# Patient Record
Sex: MALE | Race: WHITE | NOT HISPANIC OR LATINO | Employment: FULL TIME | URBAN - METROPOLITAN AREA
[De-identification: names, ages, dates, MRNs, and addresses within clinical notes are randomized per-mention and may not be internally consistent; named-entity substitution may affect disease eponyms.]

---

## 2017-04-05 ENCOUNTER — ALLSCRIPTS OFFICE VISIT (OUTPATIENT)
Dept: OTHER | Facility: OTHER | Age: 32
End: 2017-04-05

## 2017-07-17 ENCOUNTER — GENERIC CONVERSION - ENCOUNTER (OUTPATIENT)
Dept: OTHER | Facility: OTHER | Age: 32
End: 2017-07-17

## 2017-08-14 ENCOUNTER — ALLSCRIPTS OFFICE VISIT (OUTPATIENT)
Dept: OTHER | Facility: OTHER | Age: 32
End: 2017-08-14

## 2017-11-17 ENCOUNTER — ALLSCRIPTS OFFICE VISIT (OUTPATIENT)
Dept: OTHER | Facility: OTHER | Age: 32
End: 2017-11-17

## 2017-11-18 NOTE — PROGRESS NOTES
Assessment    1  Macular erythematous rash (782 1) (L53 8)    Plan  Acute URI    · Cefuroxime Axetil 500 MG Oral Tablet  Health Maintenance    · Influenza  Macular erythematous rash    · Triamcinolone Acetonide 0 5 % External Cream; APPLY SPARINGLY TOAFFECTED AREA(S) 2 TO 3 TIMES DAILY  Tinea corporis    · Ciclopirox 0 77 % External Gel    Chief Complaint    1  Rash  Patient is in the office today with concerns of a rash that keeps coming back on his left arm  History of Present Illness  HPI: Patient comes in with persistent rash medial aspect of his left biceps  This has not responded to antifungal creams  Review of Systems   Constitutional: no fever or chills, feels well, no tiredness, no recent weight loss or weight gain  Respiratory: no complaints of shortness of breath, no wheezing or cough, no dyspnea on exertion, no orthopnea or PND  Gastrointestinal: no complaints of abdominal pain, no constipation, no nausea or vomiting, no diarrhea or bloody stools  Active Problems  1  Acute URI (465 9) (J06 9)   2  Allergic rhinitis (477 9) (J30 9)   3  Asthma with acute exacerbation, unspecified asthma severity   4  Bronchitis, acute (466 0) (J20 9)   5  Gastroenteritis (558 9) (K52 9)   6  Tinea corporis (110 5) (B35 4)    Past Medical History  Active Problems And Past Medical History Reviewed: The active problems and past medical history were reviewed and updated today  Family History  Mother    1  Family history of mental disorder (V17 0) (Z81 8)   2  Family history of Hypertension (401 9) (I10)  Maternal Grandmother    3  Family history of Cancer (199 1) (C80 1)  Maternal Great Grandfather    4  Family history of Cancer (199 1) (C80 1)  Aunt    5  Family history of Cancer (199 1) (C80 1)  Family History    6   Denied: Family history of substance abuse    Social History     · Denied: History of Alcohol use   · Always uses seat belt   · Denied: History of Drug use   · Employed   · Never a smoker   · Never smoked   · Seeing a dentist   · Single    Current Meds   1  Cefuroxime Axetil 500 MG Oral Tablet; 1 bid with food; Therapy: 48CDI5856 to (Last Rx:01Myy1359)  Requested for: 14Aug2017 Ordered   2  Ciclopirox 0 77 % External Gel; APPLY AND GENTLY MASSAGE INTO AFFECTED AREA(S) TWICE DAILY; Therapy: 44WCX3241 to (Last Rx:14Aug2017)  Requested for: 19Nwm5006 Ordered    The medication list was reviewed and updated today  Allergies  1  Penicillins   2  doxycycline    Vitals   Recorded: 48ABP6546 10:01AM   Temperature 98 7 F   Heart Rate 66   Systolic 125   Diastolic 72   Height 5 ft 8 in   Weight 169 lb 8 oz   BMI Calculated 25 77   BSA Calculated 1 91   O2 Saturation 95       Physical Exam   Skin  Examination of the skin for lesions: Abnormal  -- Circular red rash with central clearing          Signatures   Electronically signed by : MAY Moses ; Nov 17 2017 10:37AM EST                       (Author)

## 2018-01-08 ENCOUNTER — ALLSCRIPTS OFFICE VISIT (OUTPATIENT)
Dept: OTHER | Facility: OTHER | Age: 33
End: 2018-01-08

## 2018-01-11 NOTE — MISCELLANEOUS
Message  Return to work or school:   Mio Andrew is under my professional care   He was seen in my office on 07/17/2017   He is able to return to work on  07/18/2017       Radha Galvez MD       Signatures   Electronically signed by : MAY Burton ; Jul 17 2017  1:50PM EST

## 2018-01-12 VITALS
HEIGHT: 68 IN | BODY MASS INDEX: 24.86 KG/M2 | TEMPERATURE: 97.4 F | HEART RATE: 68 BPM | OXYGEN SATURATION: 98 % | DIASTOLIC BLOOD PRESSURE: 66 MMHG | WEIGHT: 164 LBS | SYSTOLIC BLOOD PRESSURE: 112 MMHG

## 2018-01-12 VITALS
HEART RATE: 79 BPM | OXYGEN SATURATION: 98 % | WEIGHT: 164 LBS | TEMPERATURE: 100.9 F | DIASTOLIC BLOOD PRESSURE: 70 MMHG | SYSTOLIC BLOOD PRESSURE: 120 MMHG | HEIGHT: 68 IN | BODY MASS INDEX: 24.86 KG/M2

## 2018-01-12 VITALS
SYSTOLIC BLOOD PRESSURE: 118 MMHG | DIASTOLIC BLOOD PRESSURE: 72 MMHG | WEIGHT: 169.5 LBS | HEIGHT: 68 IN | OXYGEN SATURATION: 95 % | HEART RATE: 66 BPM | BODY MASS INDEX: 25.69 KG/M2 | TEMPERATURE: 98.7 F

## 2018-01-23 VITALS
SYSTOLIC BLOOD PRESSURE: 140 MMHG | OXYGEN SATURATION: 96 % | DIASTOLIC BLOOD PRESSURE: 84 MMHG | HEART RATE: 73 BPM | TEMPERATURE: 99.5 F | BODY MASS INDEX: 26.07 KG/M2 | HEIGHT: 68 IN | WEIGHT: 172 LBS

## 2018-01-23 NOTE — MISCELLANEOUS
Message  Return to work or school:   Britney Rogers is under my professional care  He was seen in my office on 1/8/2018   He is able to return to work on   01/10/2018     Please make allowances to ensure Al is not exposed to respiratory irritants while at work including chemicals  Thank you  JUSTYNA Sandoval        Signatures   Electronically signed by : Kristine Huerta; Jan 8 2018  3:26PM EST                       (Author)    Electronically signed by : Kristine Huerta; Jan 8 2018  3:55PM EST                       (Author)

## 2018-03-28 ENCOUNTER — DOCUMENTATION (OUTPATIENT)
Dept: FAMILY MEDICINE CLINIC | Facility: CLINIC | Age: 33
End: 2018-03-28

## 2018-03-28 ENCOUNTER — OFFICE VISIT (OUTPATIENT)
Dept: FAMILY MEDICINE CLINIC | Facility: CLINIC | Age: 33
End: 2018-03-28
Payer: COMMERCIAL

## 2018-03-28 VITALS
WEIGHT: 177 LBS | RESPIRATION RATE: 16 BRPM | TEMPERATURE: 98.8 F | DIASTOLIC BLOOD PRESSURE: 82 MMHG | SYSTOLIC BLOOD PRESSURE: 122 MMHG | HEART RATE: 70 BPM | BODY MASS INDEX: 26.83 KG/M2 | HEIGHT: 68 IN | OXYGEN SATURATION: 97 %

## 2018-03-28 DIAGNOSIS — J06.9 VIRAL UPPER RESPIRATORY INFECTION: Primary | ICD-10-CM

## 2018-03-28 DIAGNOSIS — J45.20 MILD INTERMITTENT ASTHMA WITHOUT COMPLICATION: ICD-10-CM

## 2018-03-28 PROBLEM — G43.909 MIGRAINE, UNSPECIFIED, NOT INTRACTABLE, WITHOUT STATUS MIGRAINOSUS: Status: ACTIVE | Noted: 2018-01-08

## 2018-03-28 PROCEDURE — 99214 OFFICE O/P EST MOD 30 MIN: CPT | Performed by: NURSE PRACTITIONER

## 2018-03-28 RX ORDER — BUTALBITAL, ACETAMINOPHEN AND CAFFEINE 50; 325; 40 MG/1; MG/1; MG/1
2 TABLET ORAL EVERY 4 HOURS PRN
COMMUNITY
Start: 2018-01-08

## 2018-03-28 RX ORDER — TRIAMCINOLONE ACETONIDE 5 MG/G
CREAM TOPICAL
COMMUNITY
Start: 2017-11-17 | End: 2018-03-28 | Stop reason: ALTCHOICE

## 2018-03-28 RX ORDER — BROMPHENIRAMINE MALEATE, PSEUDOEPHEDRINE HYDROCHLORIDE, AND DEXTROMETHORPHAN HYDROBROMIDE 2; 30; 10 MG/5ML; MG/5ML; MG/5ML
5 SYRUP ORAL 4 TIMES DAILY PRN
Qty: 120 ML | Refills: 0 | Status: SHIPPED | OUTPATIENT
Start: 2018-03-28

## 2018-03-28 NOTE — PROGRESS NOTES
Assessment/Plan:    Viral upper respiratory infection  To begin Bromfed DM as needed for cough and congestion  Counseled on increasing fluid in respiratory irritants in using a cool mist humidifier bedtime  Follow-up is symptoms do not begin to improve by early next week  Mild intermittent asthma without complication    Continue to use ProAir as needed  for shortness of breath/wheezing  Diagnoses and all orders for this visit:    Viral upper respiratory infection  -     brompheniramine-pseudoephedrine-DM 30-2-10 MG/5ML syrup; Take 5 mL by mouth 4 (four) times a day as needed for congestion or cough    Mild intermittent asthma without complication    Other orders  -     PROAIR  (90 Base) MCG/ACT inhaler; Inhale 2 puffs  -     butalbital-acetaminophen-caffeine (FIORICET,ESGIC) -40 mg per tablet; Take 2 tablets by mouth every 4 (four) hours as needed  -     Discontinue: triamcinolone (KENALOG) 0 5 % cream; Apply topically          Subjective:      Patient ID: Bobbe Hatchet is a 27 y o  male  Benjaminangela Parrishshekhar presents reporting nasal congestion x 2 days  He is also having a sore throat and a dry cough  Denies shortness of breath, wheezing, fever, or difficulty swallowing  He treated his symptoms with Mucinex, his rescue inhaler, and ibuprofen  The Mucinex and inhaler helped provide some relief of his symptoms  His last dose of ibuprofen was yesterday  (+) asthma       Sore Throat    Associated symptoms include congestion, coughing and shortness of breath  The following portions of the patient's history were reviewed and updated as appropriate: He  has no past medical history on file    He   Patient Active Problem List    Diagnosis Date Noted    Viral upper respiratory infection 03/28/2018    Mild intermittent asthma without complication 20/66/3917    Migraine, unspecified, not intractable, without status migrainosus 01/08/2018    Allergic rhinitis 10/07/2014     He  has no past surgical history on file  His family history is not on file  He  reports that he has never smoked  He has never used smokeless tobacco  He reports that he drinks alcohol  He reports that he does not use drugs  Current Outpatient Prescriptions   Medication Sig Dispense Refill    PROAIR  (90 Base) MCG/ACT inhaler Inhale 2 puffs  1    brompheniramine-pseudoephedrine-DM 30-2-10 MG/5ML syrup Take 5 mL by mouth 4 (four) times a day as needed for congestion or cough 120 mL 0    butalbital-acetaminophen-caffeine (FIORICET,ESGIC) -40 mg per tablet Take 2 tablets by mouth every 4 (four) hours as needed       No current facility-administered medications for this visit  No current outpatient prescriptions on file prior to visit  No current facility-administered medications on file prior to visit  He is allergic to doxycycline and penicillins       Review of Systems   Constitutional: Positive for fever  HENT: Positive for congestion, sinus pressure and sore throat  Eyes: Negative  Respiratory: Positive for cough and shortness of breath  Negative for chest tightness and wheezing  Gastrointestinal: Negative  Neurological: Negative  Psychiatric/Behavioral: Negative  /82   Pulse 70   Temp 98 8 °F (37 1 °C)   Resp 16   Ht 5' 8" (1 727 m)   Wt 80 3 kg (177 lb)   SpO2 97%   BMI 26 91 kg/m²     Objective:     Physical Exam   Constitutional: He is oriented to person, place, and time  He appears well-developed and well-nourished  HENT:   Head: Normocephalic and atraumatic  Right Ear: Tympanic membrane and external ear normal    Left Ear: Tympanic membrane and external ear normal    Nose: Mucosal edema and rhinorrhea present  Right sinus exhibits no maxillary sinus tenderness and no frontal sinus tenderness  Left sinus exhibits no maxillary sinus tenderness and no frontal sinus tenderness     Mouth/Throat: Oropharynx is clear and moist and mucous membranes are normal    Neck: Neck supple  Cardiovascular: Normal rate and regular rhythm  Pulmonary/Chest: Effort normal and breath sounds normal  No respiratory distress  He has no wheezes  He has no rales  He exhibits no tenderness  Lymphadenopathy:     He has no cervical adenopathy  Neurological: He is alert and oriented to person, place, and time  Skin: Skin is warm and dry  Psychiatric: He has a normal mood and affect   His behavior is normal  Judgment and thought content normal

## 2018-03-28 NOTE — ASSESSMENT & PLAN NOTE
To begin Bromfed DM as needed for cough and congestion  Counseled on increasing fluid in respiratory irritants in using a cool mist humidifier bedtime  Follow-up is symptoms do not begin to improve by early next week

## 2018-05-16 ENCOUNTER — TELEPHONE (OUTPATIENT)
Dept: FAMILY MEDICINE CLINIC | Facility: CLINIC | Age: 33
End: 2018-05-16

## 2018-05-16 NOTE — TELEPHONE ENCOUNTER
Pt has sinus pressure, congestion and cough and would like an abx send  To the pharmacy for him  He is not able to come in for an appt  Please advise

## 2018-05-16 NOTE — TELEPHONE ENCOUNTER
I am not comfortable sending in an antibiotic unless the patient is seen  He may try over-the-counter cold medication, but if he feeling that poorly it would be good to make an appointment, thank you

## 2018-05-17 ENCOUNTER — OFFICE VISIT (OUTPATIENT)
Dept: FAMILY MEDICINE CLINIC | Facility: CLINIC | Age: 33
End: 2018-05-17
Payer: COMMERCIAL

## 2018-05-17 VITALS
WEIGHT: 166 LBS | SYSTOLIC BLOOD PRESSURE: 126 MMHG | HEART RATE: 68 BPM | TEMPERATURE: 99.5 F | HEIGHT: 68 IN | BODY MASS INDEX: 25.16 KG/M2 | OXYGEN SATURATION: 98 % | RESPIRATION RATE: 18 BRPM | DIASTOLIC BLOOD PRESSURE: 74 MMHG

## 2018-05-17 DIAGNOSIS — J02.9 PHARYNGITIS, UNSPECIFIED ETIOLOGY: Primary | ICD-10-CM

## 2018-05-17 PROBLEM — J06.9 VIRAL UPPER RESPIRATORY INFECTION: Status: RESOLVED | Noted: 2018-03-28 | Resolved: 2018-05-17

## 2018-05-17 LAB — S PYO AG THROAT QL: NEGATIVE

## 2018-05-17 PROCEDURE — 99213 OFFICE O/P EST LOW 20 MIN: CPT | Performed by: NURSE PRACTITIONER

## 2018-05-17 PROCEDURE — 87880 STREP A ASSAY W/OPTIC: CPT | Performed by: NURSE PRACTITIONER

## 2018-05-17 PROCEDURE — 1036F TOBACCO NON-USER: CPT | Performed by: NURSE PRACTITIONER

## 2018-05-17 PROCEDURE — 3008F BODY MASS INDEX DOCD: CPT | Performed by: NURSE PRACTITIONER

## 2018-05-17 PROCEDURE — 87070 CULTURE OTHR SPECIMN AEROBIC: CPT | Performed by: NURSE PRACTITIONER

## 2018-05-17 RX ORDER — METHOCARBAMOL 500 MG/1
500 TABLET, FILM COATED ORAL EVERY 4 HOURS PRN
Refills: 0 | COMMUNITY
Start: 2018-04-11 | End: 2018-05-17 | Stop reason: ALTCHOICE

## 2018-05-17 RX ORDER — OXYCODONE HYDROCHLORIDE AND ACETAMINOPHEN 5; 325 MG/1; MG/1
1 TABLET ORAL EVERY 6 HOURS PRN
Refills: 0 | COMMUNITY
Start: 2018-04-09 | End: 2018-05-17 | Stop reason: ALTCHOICE

## 2018-05-17 RX ORDER — IBUPROFEN 800 MG/1
TABLET ORAL
Refills: 0 | COMMUNITY
Start: 2018-04-10

## 2018-05-17 NOTE — ASSESSMENT & PLAN NOTE
Rapid strep negative, will send out throat culture and follow  To begin viscous lidocaine as needed for relief  Counseled on increasing fluid intake and gargling with salt water  Follow-up as needed

## 2018-05-17 NOTE — PROGRESS NOTES
Assessment/Plan:    Pharyngitis  Rapid strep negative, will send out throat culture and follow  To begin viscous lidocaine as needed for relief  Counseled on increasing fluid intake and gargling with salt water  Follow-up as needed  Diagnoses and all orders for this visit:    Pharyngitis, unspecified etiology  -     POCT rapid strepA  -     Throat culture  -     lidocaine viscous (XYLOCAINE) 2 % mucosal solution; Swish and spit 15 mL 4 (four) times a day as needed for mild pain    Other orders  -     ibuprofen (MOTRIN) 800 mg tablet; TAKE 1 TABLET BY MOUTH 3 TIMES A DAY TAKE WITH FOOD MAX 3 TABS A DAY  -     Discontinue: methocarbamol (ROBAXIN) 500 mg tablet; Take 500 mg by mouth every 4 (four) hours as needed  -     Discontinue: oxyCODONE-acetaminophen (PERCOCET) 5-325 mg per tablet; Take 1 tablet by mouth every 6 (six) hours as needed          Subjective:      Patient ID: Snehal Peña is a 27 y o  male  Sore Throat    This is a new problem  Episode onset: three days ago  The problem has been unchanged  There has been no fever  Associated symptoms include congestion, coughing, headaches and swollen glands  He has had no exposure to strep  He has tried NSAIDs for the symptoms  The treatment provided mild relief  The following portions of the patient's history were reviewed and updated as appropriate: allergies, current medications, past family history, past medical history, past social history, past surgical history and problem list     Review of Systems   Constitutional: Positive for fatigue  HENT: Positive for congestion, mouth sores and sore throat  Respiratory: Positive for cough  Cardiovascular: Negative  Neurological: Positive for headaches  Psychiatric/Behavioral: Negative            /74   Pulse 68   Temp 99 5 °F (37 5 °C)   Resp 18   Ht 5' 8" (1 727 m)   Wt 75 3 kg (166 lb)   SpO2 98%   BMI 25 24 kg/m²     Objective:     Physical Exam Constitutional: He is oriented to person, place, and time  He appears well-developed and well-nourished  HENT:   Head: Normocephalic and atraumatic  Right Ear: External ear normal    Left Ear: External ear normal    Nose: Nose normal    Mouth/Throat: Posterior oropharyngeal erythema present  No oropharyngeal exudate  Neck: Neck supple  Cardiovascular: Normal rate, regular rhythm and normal heart sounds  No murmur heard  Pulmonary/Chest: Effort normal and breath sounds normal  No respiratory distress  He has no wheezes  He has no rales  He exhibits no tenderness  Lymphadenopathy:     He has no cervical adenopathy  Neurological: He is oriented to person, place, and time  Skin: Skin is warm and dry  Psychiatric: He has a normal mood and affect   His behavior is normal  Judgment and thought content normal        Results for orders placed or performed in visit on 05/17/18   POCT rapid strepA   Result Value Ref Range     RAPID STREP A Negative Negative

## 2018-05-18 ENCOUNTER — TELEPHONE (OUTPATIENT)
Dept: FAMILY MEDICINE CLINIC | Facility: CLINIC | Age: 33
End: 2018-05-18

## 2018-05-18 DIAGNOSIS — J06.9 URI, ACUTE: Primary | ICD-10-CM

## 2018-05-18 RX ORDER — CEFUROXIME AXETIL 500 MG/1
500 TABLET ORAL 2 TIMES DAILY
Qty: 20 TABLET | Refills: 0 | Status: SHIPPED | OUTPATIENT
Start: 2018-05-18 | End: 2018-05-28

## 2018-05-18 NOTE — TELEPHONE ENCOUNTER
Pt was seen yesterday by Andres Suazo  Last night he started to feel worse  He vomites, has 101 0 fever and sinus pressure  He would like an abx  He does not want to wait until Monday for Andres Suazo  Please advise

## 2018-05-19 LAB — BACTERIA THROAT CULT: NORMAL

## 2018-10-23 ENCOUNTER — TELEPHONE (OUTPATIENT)
Dept: FAMILY MEDICINE CLINIC | Facility: CLINIC | Age: 33
End: 2018-10-23

## 2018-10-24 NOTE — TELEPHONE ENCOUNTER
Patient has not been seen since may, He would need to be seen before Claudio Foster will write a note

## 2018-10-24 NOTE — TELEPHONE ENCOUNTER
What is the note for? I last saw him in May  If it was due to being sick, I did not see him I am not comfortable providing this

## 2019-01-14 ENCOUNTER — APPOINTMENT (OUTPATIENT)
Dept: URGENT CARE | Facility: MEDICAL CENTER | Age: 34
End: 2019-01-14
Payer: OTHER MISCELLANEOUS

## 2019-01-14 PROCEDURE — G0382 LEV 3 HOSP TYPE B ED VISIT: HCPCS | Performed by: PHYSICIAN ASSISTANT

## 2019-01-14 PROCEDURE — 99283 EMERGENCY DEPT VISIT LOW MDM: CPT | Performed by: PHYSICIAN ASSISTANT

## 2019-01-16 ENCOUNTER — APPOINTMENT (OUTPATIENT)
Dept: URGENT CARE | Facility: MEDICAL CENTER | Age: 34
End: 2019-01-16
Payer: OTHER MISCELLANEOUS

## 2019-01-16 PROCEDURE — 99213 OFFICE O/P EST LOW 20 MIN: CPT | Performed by: NURSE PRACTITIONER

## 2019-01-23 ENCOUNTER — APPOINTMENT (OUTPATIENT)
Dept: URGENT CARE | Facility: MEDICAL CENTER | Age: 34
End: 2019-01-23
Payer: OTHER MISCELLANEOUS

## 2019-01-23 PROCEDURE — 99213 OFFICE O/P EST LOW 20 MIN: CPT

## 2019-06-20 ENCOUNTER — APPOINTMENT (OUTPATIENT)
Dept: URGENT CARE | Facility: MEDICAL CENTER | Age: 34
End: 2019-06-20
Payer: OTHER MISCELLANEOUS

## 2019-06-20 PROCEDURE — 99283 EMERGENCY DEPT VISIT LOW MDM: CPT | Performed by: PHYSICIAN ASSISTANT

## 2019-06-20 PROCEDURE — G0382 LEV 3 HOSP TYPE B ED VISIT: HCPCS | Performed by: PHYSICIAN ASSISTANT

## 2019-06-25 ENCOUNTER — APPOINTMENT (OUTPATIENT)
Dept: OCCUPATIONAL MEDICINE | Facility: CLINIC | Age: 34
End: 2019-06-25
Payer: OTHER MISCELLANEOUS

## 2019-06-25 PROCEDURE — 99213 OFFICE O/P EST LOW 20 MIN: CPT

## 2019-07-09 ENCOUNTER — APPOINTMENT (OUTPATIENT)
Dept: OCCUPATIONAL MEDICINE | Facility: CLINIC | Age: 34
End: 2019-07-09
Payer: OTHER MISCELLANEOUS

## 2019-07-09 PROCEDURE — 99213 OFFICE O/P EST LOW 20 MIN: CPT

## 2019-07-16 ENCOUNTER — APPOINTMENT (OUTPATIENT)
Dept: OCCUPATIONAL MEDICINE | Facility: CLINIC | Age: 34
End: 2019-07-16
Payer: OTHER MISCELLANEOUS

## 2019-07-16 PROCEDURE — 99213 OFFICE O/P EST LOW 20 MIN: CPT

## 2019-08-05 ENCOUNTER — APPOINTMENT (OUTPATIENT)
Dept: OCCUPATIONAL MEDICINE | Facility: CLINIC | Age: 34
End: 2019-08-05
Payer: OTHER MISCELLANEOUS

## 2019-08-05 PROCEDURE — 99213 OFFICE O/P EST LOW 20 MIN: CPT

## 2019-08-14 ENCOUNTER — OFFICE VISIT (OUTPATIENT)
Dept: FAMILY MEDICINE CLINIC | Facility: CLINIC | Age: 34
End: 2019-08-14
Payer: COMMERCIAL

## 2019-08-14 VITALS
WEIGHT: 165 LBS | SYSTOLIC BLOOD PRESSURE: 126 MMHG | HEART RATE: 81 BPM | TEMPERATURE: 97.5 F | RESPIRATION RATE: 16 BRPM | DIASTOLIC BLOOD PRESSURE: 80 MMHG | OXYGEN SATURATION: 98 % | BODY MASS INDEX: 26.52 KG/M2 | HEIGHT: 66 IN

## 2019-08-14 DIAGNOSIS — B34.9 VIRAL SYNDROME: Primary | ICD-10-CM

## 2019-08-14 PROCEDURE — 1036F TOBACCO NON-USER: CPT | Performed by: FAMILY MEDICINE

## 2019-08-14 PROCEDURE — 99213 OFFICE O/P EST LOW 20 MIN: CPT | Performed by: FAMILY MEDICINE

## 2019-08-14 PROCEDURE — 3008F BODY MASS INDEX DOCD: CPT | Performed by: FAMILY MEDICINE

## 2019-08-14 NOTE — PROGRESS NOTES
Assessment/Plan:       Diagnoses and all orders for this visit:    Viral syndrome        Viral syndrome  Symptomatic treatment        Subjective:      Patient ID: Regina Ferrell is a 26 y o  male  24 hour history of fever, chills, achiness and nausea      The following portions of the patient's history were reviewed and updated as appropriate:   He has no past medical history on file  ,  does not have any pertinent problems on file  ,   has no past surgical history on file  ,  family history is not on file  ,   reports that he has never smoked  He has never used smokeless tobacco  He reports that he drinks alcohol  He reports that he does not use drugs  ,  is allergic to doxycycline and penicillins     Current Outpatient Medications   Medication Sig Dispense Refill    brompheniramine-pseudoephedrine-DM 30-2-10 MG/5ML syrup Take 5 mL by mouth 4 (four) times a day as needed for congestion or cough 120 mL 0    butalbital-acetaminophen-caffeine (FIORICET,ESGIC) -40 mg per tablet Take 2 tablets by mouth every 4 (four) hours as needed      ibuprofen (MOTRIN) 800 mg tablet TAKE 1 TABLET BY MOUTH 3 TIMES A DAY TAKE WITH FOOD MAX 3 TABS A DAY  0    lidocaine viscous (XYLOCAINE) 2 % mucosal solution Swish and spit 15 mL 4 (four) times a day as needed for mild pain 50 mL 0    PROAIR  (90 Base) MCG/ACT inhaler Inhale 2 puffs  1     No current facility-administered medications for this visit  Review of Systems   Constitutional: Positive for chills and fever  HENT: Positive for congestion  Gastrointestinal: Positive for nausea  Musculoskeletal: Positive for arthralgias and myalgias  Objective:  Vitals:    08/14/19 1119   BP: 126/80   Pulse: 81   Resp: 16   Temp: 97 5 °F (36 4 °C)   SpO2: 98%   Weight: 74 8 kg (165 lb)   Height: 5' 6" (1 676 m)     Body mass index is 26 63 kg/m²  Physical Exam   Constitutional: He is oriented to person, place, and time   He appears well-developed and well-nourished  HENT:   Head: Normocephalic and atraumatic  Right Ear: Tympanic membrane and external ear normal    Left Ear: Tympanic membrane and external ear normal    Mouth/Throat: Oropharynx is clear and moist    Eyes: Pupils are equal, round, and reactive to light  EOM are normal    Neck: Neck supple  Cardiovascular: Normal rate, regular rhythm and normal heart sounds  Pulmonary/Chest: Effort normal and breath sounds normal    Abdominal: Soft  Bowel sounds are normal    Musculoskeletal: Normal range of motion  Neurological: He is alert and oriented to person, place, and time  Skin: Skin is warm and dry  Psychiatric: He has a normal mood and affect  Thought content normal      BMI Counseling: Body mass index is 26 63 kg/m²  Discussed the patient's BMI with him  The BMI is above average  BMI counseling and education was provided to the patient  Nutrition recommendations include decreasing overall calorie intake

## 2019-08-15 ENCOUNTER — TELEPHONE (OUTPATIENT)
Dept: FAMILY MEDICINE CLINIC | Facility: CLINIC | Age: 34
End: 2019-08-15

## 2019-08-15 NOTE — TELEPHONE ENCOUNTER
I would recommend another 24-48 hours, Dr Malik Gonzales noted this is viral and the z-pack will not help with that

## 2019-08-15 NOTE — TELEPHONE ENCOUNTER
This is Dr Adolfo Ordonez pt  Pt mother called and pt is still not better and wants to know if you can send in a zpack for him  [phone]

## 2019-08-16 ENCOUNTER — TELEPHONE (OUTPATIENT)
Dept: FAMILY MEDICINE CLINIC | Facility: CLINIC | Age: 34
End: 2019-08-16

## 2019-08-16 NOTE — TELEPHONE ENCOUNTER
pts mother called and he still has a fever of 101- saw BD on 8/14/19- Mother thinks he needs antibiotics -, can you assist? TY   [facility]   Call back

## 2019-08-16 NOTE — TELEPHONE ENCOUNTER
He did not have a fever when here  If he has a fever he it to alternate Tylenol and Ibuprofen  From reading the note form his visit there was no indication for an antibiotic

## 2019-08-30 ENCOUNTER — TELEPHONE (OUTPATIENT)
Dept: OTHER | Facility: OTHER | Age: 34
End: 2019-08-30

## 2019-11-13 ENCOUNTER — OFFICE VISIT (OUTPATIENT)
Dept: FAMILY MEDICINE CLINIC | Facility: CLINIC | Age: 34
End: 2019-11-13
Payer: COMMERCIAL

## 2019-11-13 VITALS
BODY MASS INDEX: 27.97 KG/M2 | WEIGHT: 174 LBS | HEART RATE: 70 BPM | HEIGHT: 66 IN | DIASTOLIC BLOOD PRESSURE: 80 MMHG | RESPIRATION RATE: 16 BRPM | OXYGEN SATURATION: 97 % | SYSTOLIC BLOOD PRESSURE: 124 MMHG | TEMPERATURE: 98 F

## 2019-11-13 DIAGNOSIS — J01.90 ACUTE NON-RECURRENT SINUSITIS, UNSPECIFIED LOCATION: Primary | ICD-10-CM

## 2019-11-13 PROBLEM — B34.9 VIRAL SYNDROME: Status: RESOLVED | Noted: 2019-08-14 | Resolved: 2019-11-13

## 2019-11-13 PROBLEM — J02.9 PHARYNGITIS: Status: RESOLVED | Noted: 2018-05-17 | Resolved: 2019-11-13

## 2019-11-13 PROCEDURE — 99213 OFFICE O/P EST LOW 20 MIN: CPT | Performed by: FAMILY MEDICINE

## 2019-11-13 PROCEDURE — 1036F TOBACCO NON-USER: CPT | Performed by: FAMILY MEDICINE

## 2019-11-13 RX ORDER — CEFUROXIME AXETIL 500 MG/1
500 TABLET ORAL
Qty: 20 TABLET | Refills: 0 | Status: SHIPPED | OUTPATIENT
Start: 2019-11-13 | End: 2019-11-23

## 2019-11-13 RX ORDER — FLUTICASONE PROPIONATE 50 MCG
2 SPRAY, SUSPENSION (ML) NASAL DAILY
Qty: 1 BOTTLE | Refills: 1 | Status: SHIPPED | OUTPATIENT
Start: 2019-11-13

## 2019-11-13 RX ORDER — METHYLPREDNISOLONE 4 MG/1
TABLET ORAL
Qty: 21 EACH | Refills: 0 | Status: SHIPPED | OUTPATIENT
Start: 2019-11-13

## 2019-11-13 NOTE — PROGRESS NOTES
Assessment/Plan:       Diagnoses and all orders for this visit:    Acute non-recurrent sinusitis, unspecified location  -     cefuroxime (CEFTIN) 500 mg tablet; Take 1 tablet (500 mg total) by mouth 2 (two) times daily after meals for 10 days  -     fluticasone (FLONASE) 50 mcg/act nasal spray; 2 sprays into each nostril daily  -     methylPREDNISolone 4 MG tablet therapy pack; Use as directed on package        No problem-specific Assessment & Plan notes found for this encounter  Subjective:      Patient ID: Nick Duggan is a 26 y o  male  Patient comes in with a 2 week history of sinus congestion and cough  The following portions of the patient's history were reviewed and updated as appropriate:   He has a past medical history of Allergic rhinitis  ,  does not have any pertinent problems on file  ,   has no past surgical history on file  ,  family history includes Cancer in his family, family, and maternal grandmother; Hypertension in his mother; Mental illness in his mother  ,   reports that he has never smoked  He has never used smokeless tobacco  He reports that he drinks alcohol  He reports that he does not use drugs  ,  is allergic to doxycycline and penicillins     Current Outpatient Medications   Medication Sig Dispense Refill    brompheniramine-pseudoephedrine-DM 30-2-10 MG/5ML syrup Take 5 mL by mouth 4 (four) times a day as needed for congestion or cough 120 mL 0    butalbital-acetaminophen-caffeine (FIORICET,ESGIC) -40 mg per tablet Take 2 tablets by mouth every 4 (four) hours as needed      ibuprofen (MOTRIN) 800 mg tablet TAKE 1 TABLET BY MOUTH 3 TIMES A DAY TAKE WITH FOOD MAX 3 TABS A DAY  0    PROAIR  (90 Base) MCG/ACT inhaler Inhale 2 puffs  1    cefuroxime (CEFTIN) 500 mg tablet Take 1 tablet (500 mg total) by mouth 2 (two) times daily after meals for 10 days 20 tablet 0    fluticasone (FLONASE) 50 mcg/act nasal spray 2 sprays into each nostril daily 1 Bottle 1    methylPREDNISolone 4 MG tablet therapy pack Use as directed on package 21 each 0     No current facility-administered medications for this visit  Review of Systems   Constitutional: Negative  HENT: Positive for congestion  Respiratory: Positive for cough  Gastrointestinal: Negative  Objective:  Vitals:    11/13/19 1055   BP: 124/80   Pulse: 70   Resp: 16   Temp: 98 °F (36 7 °C)   SpO2: 97%   Weight: 78 9 kg (174 lb)   Height: 5' 6" (1 676 m)     Body mass index is 28 08 kg/m²  Physical Exam   Constitutional: He is oriented to person, place, and time  He appears well-developed and well-nourished  HENT:   Head: Normocephalic and atraumatic  Right Ear: Tympanic membrane and external ear normal    Left Ear: Tympanic membrane and external ear normal    Paranasal sinus are red bilaterally  Posterior oropharynx is injected   Eyes: Pupils are equal, round, and reactive to light  EOM are normal    Neck: Neck supple  Cardiovascular: Normal rate, regular rhythm and normal heart sounds  Pulmonary/Chest: Effort normal and breath sounds normal    Abdominal: Soft  Bowel sounds are normal    Musculoskeletal: Normal range of motion  Neurological: He is alert and oriented to person, place, and time  Skin: Skin is warm and dry  Psychiatric: He has a normal mood and affect   Thought content normal

## 2019-11-13 NOTE — PATIENT INSTRUCTIONS
Cold Symptoms   AMBULATORY CARE:   Cold symptoms  include sneezing, dry throat, a stuffy nose, headache, watery eyes, and a cough  Your cough may be dry, or you may cough up mucus  You may also have muscle aches, joint pain, and tiredness  Rarely, you may have a fever  Cold symptoms occur from inflammation in your upper respiratory system caused by a virus  Most colds go away without treatment  Seek care immediately if:   · You have increased tiredness and weakness  · You are unable to eat  · Your heart is beating much faster than usual for you  · You see white spots in the back of your throat and your neck is swollen and sore to the touch  · You see pinpoint or larger reddish-purple dots on your skin  Contact your healthcare provider if:   · You have a fever higher than 102°F (38 9°C)  · You have new or worsening shortness of breath  · You have thick nasal drainage for more than 2 days  · Your symptoms do not improve or get worse within 5 days  · You have questions or concerns about your condition or care  Treatment for cold symptoms  may include NSAIDS to decrease muscle aches and fever  Cold medicines may also be given to decrease coughing, nasal stuffiness, sneezing, and a runny nose  Manage your cold symptoms: The following may help relieve cold symptoms, such as a dry throat and congestion:  · Gargle with mouthwash or warm salt water as directed  · Suck on throat lozenges or hard candy  · Use a cold or warm vaporizer or humidifier to ease your breathing  · Rest for at least 2 days and then as needed to decrease tiredness and weakness  · Use petroleum based jelly around your nostrils to decrease irritation from blowing your nose  · Drink plenty of liquids  Liquids will help thin and loosen thick mucus so you can cough it up  Liquids will also keep you hydrated   Ask your healthcare provider which liquids are best for you and how much to drink each day   Prevent the spread of germs  by washing your hands often  You can spread your cold germs to others for at least 3 days after your symptoms start  Do not share items, such as eating utensils  Cover your nose and mouth when you cough or sneeze using the crook of your elbow instead of your hands  Throw used tissues in the garbage  Do not smoke:  Smoking may worsen your symptoms and increase the length of time you feel sick  Talk with your healthcare provider if you need help to stop smoking  Follow up with your healthcare provider as directed:  Write down your questions so you remember to ask them during your visits  © 2017 [facility] Information is for End User's use only and may not be sold, redistributed or otherwise used for commercial purposes  All illustrations and images included in CareNotes® are the copyrighted property of [employer]  or [employer]  The above information is an  only  It is not intended as medical advice for individual conditions or treatments  Talk to your doctor, nurse or pharmacist before following any medical regimen to see if it is safe and effective for you

## 2020-01-28 ENCOUNTER — TELEPHONE (OUTPATIENT)
Dept: FAMILY MEDICINE CLINIC | Facility: CLINIC | Age: 35
End: 2020-01-28

## 2020-01-28 NOTE — TELEPHONE ENCOUNTER
T/c from Mom stating pt was vomiting, had fever and chills and wanted appt for today  Unfortunately we are booked at 100 %  Offered both our [facility] and also stated that [facility] had some as well, or to call back tomorrow at 8 am to check for [facility] availability

## 2020-05-07 DIAGNOSIS — J45.20 MILD INTERMITTENT ASTHMA WITHOUT COMPLICATION: Primary | ICD-10-CM

## 2020-05-08 RX ORDER — ALBUTEROL SULFATE 90 UG/1
AEROSOL, METERED RESPIRATORY (INHALATION)
Qty: 6.7 INHALER | Refills: 0 | Status: SHIPPED | OUTPATIENT
Start: 2020-05-08 | End: 2020-06-03

## 2020-06-03 DIAGNOSIS — J45.20 MILD INTERMITTENT ASTHMA WITHOUT COMPLICATION: ICD-10-CM

## 2020-06-03 RX ORDER — ALBUTEROL SULFATE 90 UG/1
AEROSOL, METERED RESPIRATORY (INHALATION)
Qty: 6.7 INHALER | Refills: 0 | Status: SHIPPED | OUTPATIENT
Start: 2020-06-03

## 2021-03-23 ENCOUNTER — TELEPHONE (OUTPATIENT)
Dept: FAMILY MEDICINE CLINIC | Facility: CLINIC | Age: 36
End: 2021-03-23

## 2021-03-23 NOTE — TELEPHONE ENCOUNTER
Pt called and needs a letter stating that he is a pt here in our office, this is because pt has lost his social security card and needs to provide this letter to obtain a new SS card  Please mail outt to pt when ready

## 2021-10-14 ENCOUNTER — TELEPHONE (OUTPATIENT)
Dept: FAMILY MEDICINE CLINIC | Facility: CLINIC | Age: 36
End: 2021-10-14

## 2022-04-05 ENCOUNTER — TELEPHONE (OUTPATIENT)
Dept: FAMILY MEDICINE CLINIC | Facility: CLINIC | Age: 37
End: 2022-04-05

## 2022-04-05 NOTE — TELEPHONE ENCOUNTER
Patient declines to schedule appt at this time, as of November of this year he will no longer be considered established with Dr Jodee Milan and declines to schedule

## 2022-09-27 ENCOUNTER — TELEPHONE (OUTPATIENT)
Dept: FAMILY MEDICINE CLINIC | Facility: CLINIC | Age: 37
End: 2022-09-27

## 2022-09-27 NOTE — TELEPHONE ENCOUNTER
Pt called stating he pulled a muscle in his back and his job wants a doctors note  He thinks he may need a referral to physical therapy  He doesn't know if you want an appointment with him or just want to make him the letter for leaving work today and put the referral in? Pt was advised that you don't have any openings for today, but he requested to speak with you about this

## 2022-09-28 ENCOUNTER — OFFICE VISIT (OUTPATIENT)
Dept: FAMILY MEDICINE CLINIC | Facility: CLINIC | Age: 37
End: 2022-09-28
Payer: COMMERCIAL

## 2022-09-28 VITALS
OXYGEN SATURATION: 98 % | BODY MASS INDEX: 27.13 KG/M2 | SYSTOLIC BLOOD PRESSURE: 124 MMHG | WEIGHT: 179 LBS | HEIGHT: 68 IN | HEART RATE: 67 BPM | DIASTOLIC BLOOD PRESSURE: 80 MMHG | TEMPERATURE: 98.1 F

## 2022-09-28 DIAGNOSIS — Z00.00 HEALTH MAINTENANCE EXAMINATION: Primary | ICD-10-CM

## 2022-09-28 DIAGNOSIS — J45.20 MILD INTERMITTENT ASTHMA WITHOUT COMPLICATION: ICD-10-CM

## 2022-09-28 DIAGNOSIS — S39.012A STRAIN OF LUMBAR REGION, INITIAL ENCOUNTER: ICD-10-CM

## 2022-09-28 PROCEDURE — 99395 PREV VISIT EST AGE 18-39: CPT | Performed by: FAMILY MEDICINE

## 2022-09-28 PROCEDURE — 3725F SCREEN DEPRESSION PERFORMED: CPT | Performed by: FAMILY MEDICINE

## 2022-09-28 RX ORDER — CYCLOBENZAPRINE HCL 10 MG
10 TABLET ORAL 3 TIMES DAILY PRN
Qty: 30 TABLET | Refills: 1 | Status: SHIPPED | OUTPATIENT
Start: 2022-09-28

## 2022-09-28 NOTE — PROGRESS NOTES
Name: Mathew Cooley      : 1985      MRN: [id number]  Encounter Provider: Alverto Jerome MD  Encounter Date: 2022   [facility] [address] [place] [address]   He is given off-work note1  Health maintenance examination    2  Strain of lumbar region, initial encounter  -     Ambulatory Referral to Physical Therapy; Future  -     diclofenac (VOLTAREN) 50 mg EC tablet; Take 1 tablet (50 mg total) by mouth 3 (three) times a day  -     cyclobenzaprine (FLEXERIL) 10 mg tablet; Take 1 tablet (10 mg total) by mouth 3 (three) times a day as needed for muscle spasms    3  Mild intermittent asthma without complication      BMI Counseling: Body mass index is 27 22 kg/m²  The BMI is above normal  Nutrition recommendations include decreasing portion sizes and moderation in carbohydrate intake  Exercise recommendations include exercising 3-5 times per week  No pharmacotherapy was ordered  Rationale for BMI follow-up plan is due to patient being overweight or obese  Depression Screening and Follow-up Plan: Patient was screened for depression during today's encounter  They screened negative with a PHQ-2 score of 0  Subjective      Patient comes in after injuring his back while lifting and carrying at work  This occurred 2 days ago  He has not been able return to work  His asthma has been stable for over 1 year  He he has not used albuterol inhaler at all    Review of Systems   Constitutional: Negative  Respiratory: Negative  Gastrointestinal: Negative  Musculoskeletal: Positive for back pain         Current Outpatient Medications on File Prior to Visit   Medication Sig    albuterol (PROVENTIL HFA,VENTOLIN HFA) 90 mcg/act inhaler INHALE 1 PUFF BY MOUTH EVERY 6 HOURS AS NEEDED FOR WHEEZE    brompheniramine-pseudoephedrine-DM 30-2-10 MG/5ML syrup Take 5 mL by mouth 4 (four) times a day as needed for congestion or cough (Patient not taking: Reported on 9/28/2022)    butalbital-acetaminophen-caffeine (FIORICET,ESGIC) -40 mg per tablet Take 2 tablets by mouth every 4 (four) hours as needed (Patient not taking: Reported on 9/28/2022)    fluticasone (FLONASE) 50 mcg/act nasal spray 2 sprays into each nostril daily (Patient not taking: Reported on 9/28/2022)    methylPREDNISolone 4 MG tablet therapy pack Use as directed on package (Patient not taking: Reported on 9/28/2022)    [DISCONTINUED] ibuprofen (MOTRIN) 800 mg tablet TAKE 1 TABLET BY MOUTH 3 TIMES A DAY TAKE WITH FOOD MAX 3 TABS A DAY (Patient not taking: Reported on 9/28/2022)       Objective     /80   Pulse 67   Temp 98 1 °F (36 7 °C)   Ht 5' 8" (1 727 m)   Wt 81 2 kg (179 lb)   SpO2 98%   BMI 27 22 kg/m²     Physical Exam  Constitutional:       Appearance: Normal appearance  He is well-developed  HENT:      Head: Normocephalic and atraumatic  Eyes:      Pupils: Pupils are equal, round, and reactive to light  Cardiovascular:      Rate and Rhythm: Normal rate and regular rhythm  Heart sounds: Normal heart sounds  Pulmonary:      Effort: Pulmonary effort is normal       Breath sounds: Normal breath sounds  Abdominal:      General: Bowel sounds are normal       Palpations: Abdomen is soft  Musculoskeletal:      Cervical back: Neck supple  Comments: Tender right lumbar paraspinal muscles  No midline tenderness  Straight leg raising on right side 45° reproduces pain in his low back  Skin:     General: Skin is warm and dry  Neurological:      Mental Status: He is alert  Psychiatric:         Mood and Affect: Mood normal          Behavior: Behavior normal          Thought Content:  Thought content normal        Stu Horton MD

## 2022-11-27 PROBLEM — Z00.00 HEALTH MAINTENANCE EXAMINATION: Status: RESOLVED | Noted: 2022-09-28 | Resolved: 2022-11-27

## 2023-09-30 ENCOUNTER — OFFICE VISIT (OUTPATIENT)
Dept: URGENT CARE | Facility: MEDICAL CENTER | Age: 38
End: 2023-09-30
Payer: COMMERCIAL

## 2023-09-30 VITALS
TEMPERATURE: 98.1 F | DIASTOLIC BLOOD PRESSURE: 74 MMHG | HEART RATE: 80 BPM | RESPIRATION RATE: 18 BRPM | BODY MASS INDEX: 27.37 KG/M2 | OXYGEN SATURATION: 99 % | SYSTOLIC BLOOD PRESSURE: 122 MMHG | WEIGHT: 180 LBS

## 2023-09-30 DIAGNOSIS — Z02.89 ENCOUNTER FOR PHYSICAL EXAMINATION RELATED TO EMPLOYMENT: Primary | ICD-10-CM

## 2023-09-30 NOTE — PROGRESS NOTES
[facility]        NAME: Tahira Mcneill is a 42 y.o. male  : 1985    MRN: [id number]  DATE: 2023  TIME: 1:56 PM    Assessment and Plan   Encounter for physical examination related to employment [Z02.89]  1. Encounter for physical examination related to employment              Patient Instructions   Patient seen today for preemployment physical for [employer]. Cleared, paperwork filled out copies made and returned to patient. Follow up with PCP in 3-5 days. Proceed to  ER if symptoms worsen. Chief Complaint     Chief Complaint   Patient presents with   • Annual Exam     Pre employment physical          History of Present Illness       Patient is here for preemployment physical.  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 Past medical history significant only for childhood asthma. Not currently on any medications no current medical problems or issues that he needs to discuss      Review of Systems   Review of Systems   Constitutional: Negative for chills and fever. HENT: Negative for congestion, ear pain, rhinorrhea, sinus pain and sore throat. Respiratory: Negative for cough and shortness of breath. Cardiovascular: Negative for chest pain. Gastrointestinal: Negative for abdominal pain, diarrhea, nausea and vomiting. Genitourinary: Negative for difficulty urinating and dysuria. Musculoskeletal: Negative for arthralgias. Skin: Negative for rash. Neurological: Negative for dizziness and headaches.          Current Medications       Current Outpatient Medications:   •  albuterol (PROVENTIL HFA,VENTOLIN HFA) 90 mcg/act inhaler, INHALE 1 PUFF BY MOUTH EVERY 6 HOURS AS NEEDED FOR WHEEZE, Disp: 6.7 Inhaler, Rfl: 0  •  brompheniramine-pseudoephedrine-DM 30-2-10 MG/5ML syrup, Take 5 mL by mouth 4 (four) times a day as needed for congestion or cough (Patient not taking: Reported on 2022), Disp: 120 mL, Rfl: 0  • butalbital-acetaminophen-caffeine (FIORICET,ESGIC) -40 mg per tablet, Take 2 tablets by mouth every 4 (four) hours as needed (Patient not taking: Reported on 9/28/2022), Disp: , Rfl:   •  cyclobenzaprine (FLEXERIL) 10 mg tablet, Take 1 tablet (10 mg total) by mouth 3 (three) times a day as needed for muscle spasms, Disp: 30 tablet, Rfl: 1  •  diclofenac (VOLTAREN) 50 mg EC tablet, Take 1 tablet (50 mg total) by mouth 3 (three) times a day, Disp: 30 tablet, Rfl: 1  •  fluticasone (FLONASE) 50 mcg/act nasal spray, 2 sprays into each nostril daily (Patient not taking: Reported on 9/28/2022), Disp: 1 Bottle, Rfl: 1  •  methylPREDNISolone 4 MG tablet therapy pack, Use as directed on package (Patient not taking: Reported on 9/28/2022), Disp: 21 each, Rfl: 0    Current Allergies     Allergies as of 09/30/2023 - Reviewed 09/30/2023   Allergen Reaction Noted   • Doxycycline Vomiting 10/07/2014   • Penicillins Anaphylaxis 10/07/2014            The following portions of the patient's history were reviewed and updated as appropriate: allergies, current medications, past family history, past medical history, past social history, past surgical history and problem list.     Past Medical History:   Diagnosis Date   • Allergic rhinitis     Resolved 1/8/2018       History reviewed. No pertinent surgical history. Family History   Problem Relation Age of Onset   • Mental illness Mother    • Hypertension Mother    • Cancer Maternal Grandmother    • Cancer Family    • Cancer Family          Medications have been verified. Objective   /74   Pulse 80   Temp 98.1 °F (36.7 °C)   Resp 18   Wt 81.6 kg (180 lb)   SpO2 99%   BMI 27.37 kg/m²   No LMP for male patient. Physical Exam     Physical Exam  Constitutional:       General: He is not in acute distress. Appearance: Normal appearance. He is well-developed. He is not ill-appearing or toxic-appearing. HENT:      Head: Normocephalic and atraumatic. Right Ear: Tympanic membrane, ear canal and external ear normal.      Left Ear: Tympanic membrane, ear canal and external ear normal.      Nose: Nose normal.      Mouth/Throat:      Mouth: Mucous membranes are moist.      Pharynx: No oropharyngeal exudate or posterior oropharyngeal erythema. Eyes:      Extraocular Movements: Extraocular movements intact. Conjunctiva/sclera: Conjunctivae normal.      Pupils: Pupils are equal, round, and reactive to light. Cardiovascular:      Rate and Rhythm: Normal rate and regular rhythm. Heart sounds: No murmur heard. Pulmonary:      Effort: Pulmonary effort is normal.      Breath sounds: Normal breath sounds. No wheezing. Abdominal:      Palpations: Abdomen is soft. Tenderness: There is no abdominal tenderness. Musculoskeletal:         General: Normal range of motion. Cervical back: Normal range of motion and neck supple. Skin:     General: Skin is warm and dry. Capillary Refill: Capillary refill takes less than 2 seconds. Neurological:      General: No focal deficit present. Mental Status: He is alert and oriented to person, place, and time.    Psychiatric:         Mood and Affect: Mood normal.